# Patient Record
Sex: FEMALE | Race: WHITE | URBAN - METROPOLITAN AREA
[De-identification: names, ages, dates, MRNs, and addresses within clinical notes are randomized per-mention and may not be internally consistent; named-entity substitution may affect disease eponyms.]

---

## 2023-01-16 ENCOUNTER — OFFICE VISIT (OUTPATIENT)
Dept: URBAN - METROPOLITAN AREA CLINIC 23 | Facility: CLINIC | Age: 73
End: 2023-01-16
Payer: MEDICARE

## 2023-01-16 DIAGNOSIS — H26.493 OTHER SECONDARY CATARACT, BILATERAL: ICD-10-CM

## 2023-01-16 DIAGNOSIS — H16.223 KERATOCONJUNCTIVITIS SICCA, NOT SPECIFIED AS SJÖGREN'S, BILATERAL: Primary | ICD-10-CM

## 2023-01-16 PROCEDURE — 99203 OFFICE O/P NEW LOW 30 MIN: CPT | Performed by: OPTOMETRIST

## 2023-01-16 RX ORDER — PREDNISOLONE ACETATE 10 MG/ML
1 % SUSPENSION/ DROPS OPHTHALMIC
Qty: 5 | Refills: 0 | Status: ACTIVE
Start: 2023-01-16

## 2023-01-16 ASSESSMENT — INTRAOCULAR PRESSURE
OS: 14
OD: 20

## 2023-02-16 ENCOUNTER — OFFICE VISIT (OUTPATIENT)
Dept: URBAN - METROPOLITAN AREA CLINIC 23 | Facility: CLINIC | Age: 73
End: 2023-02-16
Payer: MEDICARE

## 2023-02-16 DIAGNOSIS — H16.223 KERATOCONJUNCTIVITIS SICCA, NOT SPECIFIED AS SJÖGREN'S, BILATERAL: Primary | ICD-10-CM

## 2023-02-16 PROCEDURE — 99213 OFFICE O/P EST LOW 20 MIN: CPT | Performed by: OPTOMETRIST

## 2023-02-16 RX ORDER — PREDNISOLONE ACETATE 10 MG/ML
1 % SUSPENSION/ DROPS OPHTHALMIC
Qty: 5 | Refills: 0 | Status: ACTIVE
Start: 2023-02-16

## 2023-02-16 ASSESSMENT — INTRAOCULAR PRESSURE
OD: 14
OS: 14

## 2023-02-16 ASSESSMENT — KERATOMETRY
OD: 44.00
OS: 43.50

## 2023-03-02 ENCOUNTER — OFFICE VISIT (OUTPATIENT)
Dept: URBAN - METROPOLITAN AREA CLINIC 23 | Facility: CLINIC | Age: 73
End: 2023-03-02
Payer: MEDICARE

## 2023-03-02 DIAGNOSIS — H02.135 SENILE ECTROPION OF LEFT LOWER LID: Primary | ICD-10-CM

## 2023-03-02 PROCEDURE — 99214 OFFICE O/P EST MOD 30 MIN: CPT | Performed by: OPTOMETRIST

## 2023-03-02 ASSESSMENT — KERATOMETRY
OD: 43.50
OS: 43.00

## 2023-03-02 ASSESSMENT — INTRAOCULAR PRESSURE
OD: 12
OS: 13

## 2023-03-02 NOTE — IMPRESSION/PLAN
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 Plan: Pt edu on all findings. Pred Acetate showed no improvement over a course of 1 month. Continue AT's PRN OU for comfort. Refer to Dr. Aydee Bowie for lid consult.

## 2023-04-10 ENCOUNTER — OFFICE VISIT (OUTPATIENT)
Dept: URBAN - METROPOLITAN AREA CLINIC 23 | Facility: CLINIC | Age: 73
End: 2023-04-10
Payer: MEDICARE

## 2023-04-10 DIAGNOSIS — Z96.1 PRESENCE OF PSEUDOPHAKIA: ICD-10-CM

## 2023-04-10 DIAGNOSIS — H02.423 MYOGENIC PTOSIS OF BILATERAL EYELIDS: ICD-10-CM

## 2023-04-10 DIAGNOSIS — H04.523 EVERSION OF BILATERAL LACRIMAL PUNCTUM: ICD-10-CM

## 2023-04-10 DIAGNOSIS — H02.135 SENILE ECTROPION OF LEFT LOWER EYELID: Primary | ICD-10-CM

## 2023-04-10 DIAGNOSIS — H02.132 SENILE ECTROPION OF RIGHT LOWER EYELID: ICD-10-CM

## 2023-04-10 PROCEDURE — 92285 EXTERNAL OCULAR PHOTOGRAPHY: CPT | Performed by: OPHTHALMOLOGY

## 2023-04-10 PROCEDURE — 99204 OFFICE O/P NEW MOD 45 MIN: CPT | Performed by: OPHTHALMOLOGY

## 2023-04-10 ASSESSMENT — INTRAOCULAR PRESSURE
OS: 13
OD: 13

## 2023-04-10 NOTE — IMPRESSION/PLAN
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 Plan: Patient examined. Chart review. Patient has signs and symptoms and findings consistent involutional ectropion. This was diagrammatically explained to the patient. Patient voiced understanding. Discussed known options for management (do nothing, conservative management, and surgical intervention.) Risks of surgery discussed: bleeding, infection, dry eye, asymmetry, numbness, and/or need for further surgery. Patient wishes to proceed  with surgery 39861- REPAIR ECTROPION, EXTENSIVE (TARSAL STRIP) [place] REARRANGEMENT TO CORRECT PUNCTAL ECTROPION

## 2023-04-10 NOTE — IMPRESSION/PLAN
Impression: Myogenic ptosis of bilateral eyelids: H02.423. Plan: Will re-asses after ectropion surgery.

## 2023-06-01 ENCOUNTER — OFFICE VISIT (OUTPATIENT)
Dept: URBAN - METROPOLITAN AREA CLINIC 32 | Facility: CLINIC | Age: 73
End: 2023-06-01
Payer: MEDICARE

## 2023-06-01 DIAGNOSIS — H04.522 EVERSION OF LEFT LACRIMAL PUNCTUM: Primary | ICD-10-CM

## 2023-06-01 DIAGNOSIS — H02.135 SENILE ECTROPION OF LEFT LOWER EYELID: ICD-10-CM

## 2023-06-01 DIAGNOSIS — H04.562 STENOSIS OF LEFT LACRIMAL PUNCTUM: ICD-10-CM

## 2023-06-01 PROCEDURE — 99214 OFFICE O/P EST MOD 30 MIN: CPT | Performed by: OPHTHALMOLOGY

## 2023-06-01 PROCEDURE — 92285 EXTERNAL OCULAR PHOTOGRAPHY: CPT | Performed by: OPHTHALMOLOGY

## 2023-06-01 ASSESSMENT — INTRAOCULAR PRESSURE
OD: 12
OS: 11

## 2023-06-01 NOTE — IMPRESSION/PLAN
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 Left. Condition: established, stable. Symptoms: may improve with surgery. Vision: vision threatening. Plan: Discussed diagnosis in detail with patient. Discussed treatment options with patient. Surgical treatment is required. Surgical risks and benefits were discussed, explained and understood by patient. Patient elects to have surgery. Recommend left conjunctivoplasty. RL2, avoid meloxicam 7-10 days prior to surgery.

## 2023-06-01 NOTE — IMPRESSION/PLAN
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 Left. Condition: established, stable. Symptoms: may improve with surgery. Vision: vision threatening. Plan: Discussed diagnosis in detail with patient. Discussed treatment options with patient. Surgical treatment is required. Surgical risks and benefits were discussed, explained and understood by patient. Patient elects to have surgery. Recommend left lower punctoplasty.

## 2023-06-01 NOTE — IMPRESSION/PLAN
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 Left. Condition: established, stable. Symptoms: may improve with surgery. Vision: vision threatening. Plan: Discussed diagnosis in detail with patient. Discussed treatment options with patient. Surgical treatment is required. Surgical risks and benefits were discussed, explained and understood by patient. Patient elects to have surgery. Recommend left lateral canthoplasty.

## 2023-06-30 ENCOUNTER — SURGERY (OUTPATIENT)
Dept: URBAN - METROPOLITAN AREA SURGERY 11 | Facility: SURGERY | Age: 73
End: 2023-06-30
Payer: MEDICARE

## 2023-06-30 PROCEDURE — 67917 REPAIR EYELID DEFECT: CPT | Performed by: OPHTHALMOLOGY

## 2023-07-13 ENCOUNTER — POST-OPERATIVE VISIT (OUTPATIENT)
Dept: URBAN - METROPOLITAN AREA CLINIC 32 | Facility: CLINIC | Age: 73
End: 2023-07-13
Payer: MEDICARE

## 2023-07-13 DIAGNOSIS — Z48.89 ENCOUNTER FOR OTHER SPECIFIED SURGICAL AFTERCARE: Primary | ICD-10-CM

## 2023-07-13 PROCEDURE — 99024 POSTOP FOLLOW-UP VISIT: CPT | Performed by: OPHTHALMOLOGY

## 2023-07-13 NOTE — IMPRESSION/PLAN
Impression: S/P conjunctvoplasty, canthoplasty,punctoplasty  - 13 Days. Encounter for other specified surgical aftercare  Z48.89.  Plan: may use ice as needed for comfort
healing well
final photo today

## 2023-07-17 ENCOUNTER — OFFICE VISIT (OUTPATIENT)
Dept: URBAN - METROPOLITAN AREA CLINIC 23 | Facility: CLINIC | Age: 73
End: 2023-07-17
Payer: MEDICARE

## 2023-07-17 DIAGNOSIS — H26.493 OTHER SECONDARY CATARACT, BILATERAL: Primary | ICD-10-CM

## 2023-07-17 DIAGNOSIS — H52.223 REGULAR ASTIGMATISM, BILATERAL: ICD-10-CM

## 2023-07-17 PROCEDURE — 92015 DETERMINE REFRACTIVE STATE: CPT | Performed by: OPTOMETRIST

## 2023-07-17 PROCEDURE — 99213 OFFICE O/P EST LOW 20 MIN: CPT | Performed by: OPTOMETRIST

## 2023-07-17 ASSESSMENT — INTRAOCULAR PRESSURE
OD: 13
OS: 13

## 2023-07-17 ASSESSMENT — KERATOMETRY
OD: 43.63
OS: 44.00

## 2023-07-17 NOTE — IMPRESSION/PLAN
Impression: Regular astigmatism, bilateral: H52.223. Plan: Discussed diagnosis in detail with patient. New glasses Rx was given today. Instructed patient to call with any concerns.

## 2023-07-17 NOTE — IMPRESSION/PLAN
Impression: Other secondary cataract, bilateral: H26.493. Plan: Discussed diagnosis in detail with patient. Secondary cataracts affecting vision some, but no surgery is currently recommended. The patient will monitor vision changes and contact us with any decrease in vision. New srx was given today. Continue AT's PRN OU for comfort. Will continue to monitor. RTC 1 year YAG evaluation.